# Patient Record
Sex: MALE | Race: WHITE
[De-identification: names, ages, dates, MRNs, and addresses within clinical notes are randomized per-mention and may not be internally consistent; named-entity substitution may affect disease eponyms.]

---

## 2018-08-28 ENCOUNTER — HOSPITAL ENCOUNTER (INPATIENT)
Dept: HOSPITAL 61 - ER | Age: 23
LOS: 1 days | Discharge: HOME | DRG: 313 | End: 2018-08-29
Attending: FAMILY MEDICINE | Admitting: FAMILY MEDICINE
Payer: COMMERCIAL

## 2018-08-28 VITALS — BODY MASS INDEX: 30.92 KG/M2 | WEIGHT: 216 LBS | HEIGHT: 70 IN

## 2018-08-28 VITALS — DIASTOLIC BLOOD PRESSURE: 63 MMHG | SYSTOLIC BLOOD PRESSURE: 114 MMHG

## 2018-08-28 VITALS — DIASTOLIC BLOOD PRESSURE: 61 MMHG | SYSTOLIC BLOOD PRESSURE: 113 MMHG

## 2018-08-28 DIAGNOSIS — F14.10: ICD-10-CM

## 2018-08-28 DIAGNOSIS — F12.10: ICD-10-CM

## 2018-08-28 DIAGNOSIS — E66.9: ICD-10-CM

## 2018-08-28 DIAGNOSIS — F41.9: ICD-10-CM

## 2018-08-28 DIAGNOSIS — R07.89: Primary | ICD-10-CM

## 2018-08-28 DIAGNOSIS — Z79.82: ICD-10-CM

## 2018-08-28 DIAGNOSIS — I25.2: ICD-10-CM

## 2018-08-28 DIAGNOSIS — I42.9: ICD-10-CM

## 2018-08-28 LAB
AMPHETAMINE/METHAMPHETAMINE: (no result)
ANION GAP SERPL CALC-SCNC: 7 MMOL/L (ref 6–14)
APTT PPP: (no result) S
BACTERIA #/AREA URNS HPF: (no result) /HPF
BARBITURATES UR-MCNC: (no result) UG/ML
BASOPHILS # BLD AUTO: 0 X10^3/UL (ref 0–0.2)
BASOPHILS NFR BLD: 0 % (ref 0–3)
BENZODIAZ UR-MCNC: (no result) UG/L
BILIRUB UR QL STRIP: (no result)
BUN SERPL-MCNC: 12 MG/DL (ref 8–26)
CALCIUM SERPL-MCNC: 8.7 MG/DL (ref 8.5–10.1)
CANNABINOIDS UR-MCNC: (no result) UG/L
CHLORIDE SERPL-SCNC: 104 MMOL/L (ref 98–107)
CK SERPL-CCNC: 122 U/L (ref 39–308)
CO2 SERPL-SCNC: 27 MMOL/L (ref 21–32)
COCAINE UR-MCNC: (no result) NG/ML
CREAT SERPL-MCNC: 0.9 MG/DL (ref 0.7–1.3)
EOSINOPHIL NFR BLD: 0.1 X10^3/UL (ref 0–0.7)
EOSINOPHIL NFR BLD: 1 % (ref 0–3)
ERYTHROCYTE [DISTWIDTH] IN BLOOD BY AUTOMATED COUNT: 13.3 % (ref 11.5–14.5)
FIBRINOGEN PPP-MCNC: CLEAR MG/DL
GFR SERPLBLD BASED ON 1.73 SQ M-ARVRAT: 104.6 ML/MIN
GLUCOSE SERPL-MCNC: 101 MG/DL (ref 70–99)
HCT VFR BLD CALC: 45.6 % (ref 39–53)
HGB BLD-MCNC: 15.4 G/DL (ref 13–17.5)
HYALINE CASTS #/AREA URNS LPF: (no result) /HPF
LYMPHOCYTES # BLD: 1.8 X10^3/UL (ref 1–4.8)
LYMPHOCYTES NFR BLD AUTO: 19 % (ref 24–48)
MAGNESIUM SERPL-MCNC: 1.9 MG/DL (ref 1.8–2.4)
MCH RBC QN AUTO: 29 PG (ref 25–35)
MCHC RBC AUTO-ENTMCNC: 34 G/DL (ref 31–37)
MCV RBC AUTO: 85 FL (ref 79–100)
METHADONE SERPL-MCNC: (no result) NG/ML
MONO #: 0.8 X10^3/UL (ref 0–1.1)
MONOCYTES NFR BLD: 9 % (ref 0–9)
NEUT #: 6.7 X10^3UL (ref 1.8–7.7)
NEUTROPHILS NFR BLD AUTO: 71 % (ref 31–73)
NITRITE UR QL STRIP: NEGATIVE
OPIATES UR-MCNC: (no result) NG/ML
PCP SERPL-MCNC: (no result) MG/DL
PH UR STRIP: 6 [PH]
PLATELET # BLD AUTO: 276 X10^3/UL (ref 140–400)
POTASSIUM SERPL-SCNC: 3.5 MMOL/L (ref 3.5–5.1)
PROT UR STRIP-MCNC: 30 MG/DL
RBC # BLD AUTO: 5.34 X10^6/UL (ref 4.3–5.7)
RBC #/AREA URNS HPF: (no result) /HPF (ref 0–2)
SODIUM SERPL-SCNC: 138 MMOL/L (ref 136–145)
SQUAMOUS #/AREA URNS LPF: (no result) /LPF
UROBILINOGEN UR-MCNC: 1 MG/DL
WBC # BLD AUTO: 9.4 X10^3/UL (ref 4–11)
WBC #/AREA URNS HPF: (no result) /HPF (ref 0–4)

## 2018-08-28 PROCEDURE — 85025 COMPLETE CBC W/AUTO DIFF WBC: CPT

## 2018-08-28 PROCEDURE — 93306 TTE W/DOPPLER COMPLETE: CPT

## 2018-08-28 PROCEDURE — 83735 ASSAY OF MAGNESIUM: CPT

## 2018-08-28 PROCEDURE — 71045 X-RAY EXAM CHEST 1 VIEW: CPT

## 2018-08-28 PROCEDURE — 83880 ASSAY OF NATRIURETIC PEPTIDE: CPT

## 2018-08-28 PROCEDURE — 84484 ASSAY OF TROPONIN QUANT: CPT

## 2018-08-28 PROCEDURE — 36415 COLL VENOUS BLD VENIPUNCTURE: CPT

## 2018-08-28 PROCEDURE — 84443 ASSAY THYROID STIM HORMONE: CPT

## 2018-08-28 PROCEDURE — 87086 URINE CULTURE/COLONY COUNT: CPT

## 2018-08-28 PROCEDURE — 80307 DRUG TEST PRSMV CHEM ANLYZR: CPT

## 2018-08-28 PROCEDURE — 93005 ELECTROCARDIOGRAM TRACING: CPT

## 2018-08-28 PROCEDURE — 82553 CREATINE MB FRACTION: CPT

## 2018-08-28 PROCEDURE — 81001 URINALYSIS AUTO W/SCOPE: CPT

## 2018-08-28 PROCEDURE — 80048 BASIC METABOLIC PNL TOTAL CA: CPT

## 2018-08-28 PROCEDURE — 85379 FIBRIN DEGRADATION QUANT: CPT

## 2018-08-28 PROCEDURE — G0479 DRUG TEST PRESUMP NOT OPT: HCPCS

## 2018-08-28 RX ADMIN — NITROGLYCERIN PRN MG: 0.4 TABLET SUBLINGUAL at 15:30

## 2018-08-28 RX ADMIN — NITROGLYCERIN PRN MG: 0.4 TABLET SUBLINGUAL at 14:54

## 2018-08-28 RX ADMIN — NITROGLYCERIN PRN MG: 0.4 TABLET SUBLINGUAL at 20:35

## 2018-08-28 RX ADMIN — NITROGLYCERIN PRN MG: 0.4 TABLET SUBLINGUAL at 20:06

## 2018-08-28 RX ADMIN — NITROGLYCERIN PRN MG: 0.4 TABLET SUBLINGUAL at 15:23

## 2018-08-28 NOTE — EKG
[facility]

              [address] [place] [address]

Test Date:    2018               Test Time:    13:53:31

Pat Name:     LALIT MONK              Department:   

Patient ID:   [id number]           Room:          

Gender:       M                        Technician:   

:          [id number]               Requested By: LOIDA PATEL

Order Number: [id number]           Reading MD:   Erik Mott MD

                                 Measurements

Intervals                              Axis          

Rate:         77                       P:            41

SD:           146                      QRS:          56

QRSD:         96                       T:            28

QT:           346                                    

QTc:          393                                    

                           Interpretive Statements

SINUS RHYTHM



Electronically Signed On 2018 12:27:46 CDT by Erik Mott MD

## 2018-08-28 NOTE — RAD
PORTABLE CHEST 1V dated 8/28/2018 2:22 PM.

 

Comparison: 12/20/2017

 

Clinical Indication: CHEST PAIN STARTING @ 1100 TODAY..

 

Findings:

 

Single upright portable exam performed. Heart and mediastinal contours are

within normal limits. Lungs are clear without focal consolidation. 

Vascular interstitium within normal limits. No pleural effusion or 

pneumothorax.

 

Impression:

 

Negative portable chest. 

 

Electronically signed by: Shree Almeida MD (8/28/2018 2:39 PM) 

[facility]

## 2018-08-28 NOTE — PDOC2
JOVAN THORPE APRN 8/28/18 1639:


CARDIAC CONSULT


DATE OF CONSULT


Date of Consult


DATE: 8/28/18 


TIME: 16:22





REASON FOR CONSULT


Reason for Consult:


Chest pain





REFERRING PHYSICIAN


Referring Physician:


Mike





SOURCE


Source:  Chart review, Patient





HISTORY OF PRESENT ILLNESS


HISTORY OF PRESENT ILLNESS


This is a pleasant 21 yo male admitted for complains of chest pain.  He is 

significant for NICM and had C 12/2017 which showed no CAD but with EF of 45%

. This was then optimized further as an outpt.  Reports no GARDUNO, exertional CP, 

palpiations, nausea or vomiting or significant heartburn.  No recent heavy 

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 

he climb up today and after he got up and at the roof he started having this 

focal left sternal sharp pain which is nonradiating and this reproducible with 

palpation and also with upper arm ROM. This pain is not the same as when he had 

the NSTEMI in 12/2017 which was ruled as vasopasm with use of cocaine.  He 

still continues to take norvasc. He used to use cocain and ecstasy and he has 

not used it since 12/2017.





PAST MEDICAL HISTORY


Cardiovascular:  Other (NICM; NSTEMI)


Pulmonary:  No pertinent hx


CENTRAL NERVOUS SYSTEM:  Other (None)


GI:  No pertinent hx


Heme/Onc:  No pertinent hx


Hepatobiliary:  No pertinent hx


Psych:  Anxiety


Musculoskeletal:  Other (None)


Rheumatologic:  No pertinent hx


Infectious disease:  No pertinent hx


ENT:  No pertinent hx


Renal/:  No pertinent hx


Endocrine:  No pertinent hx


Dermatology:  No pertinent hx





PAST SURGICAL HISTORY


Past Surgical History:  Other ([facility])





FAMILY HISTORY


Family History


noncontributory to CV





SOCIAL HISTORY


Smoke:  No


ALCOHOL:  none


Drugs:  Other (Past ecstasy and cocaine use)


Lives:  with Family





CURRENT MEDICATIONS


CURRENT MEDICATIONS





Current Medications








 Medications


  (Trade)  Dose


 Ordered  Sig/Mikel


 Route


 PRN Reason  Start Time


 Stop Time Status Last Admin


Dose Admin


 


 Aspirin


  (Juan Jose Aspirin)  325 mg  1X  ONCE


 PO


   8/28/18 14:15


 8/28/18 14:16 DC 8/28/18 14:53





 


 Nitroglycerin


  (Nitrostat)  0.4 mg  PRN Q5MIN  PRN


 SL


 CP RATING > 1/10  8/28/18 14:15


 8/29/18 14:14  8/28/18 15:30





 


 Sodium Chloride  1,000 ml @ 


 1,000 mls/hr  1X  ONCE


 IV


   8/28/18 14:15


 8/28/18 15:14 DC 8/28/18 14:53














ALLERGIES


ALLERGIES:  


Coded Allergies:  


     No Known Drug Allergies (Unverified , 12/20/17)





ROS


Review of System


14 point ROS evaluated with pertinent positives noted per HPI





PHYSICAL EXAM


General:  Alert, Oriented X3, Cooperative, No acute distress


HEENT:  Atraumatic, Mucous membr. moist/pink


Lungs:  Clear to auscultation, Normal air movement


Heart:  Regular rate (SR), Normal S1, Normal S2, No murmurs


Abdomen:  Soft, No tenderness


Extremities:  No cyanosis, No edema


Skin:  No breakdown, No significant lesion


Neuro:  Normal speech, Sensation intact


Psych/Mental Status:  Mental status NL, Mood NL


MUSCULOSKELETAL:  Osteoarthritic changes both hands





VITALS


VITALS





Vital Signs








  Date Time  Temp Pulse Resp B/P (MAP) Pulse Ox O2 Delivery O2 Flow Rate FiO2


 


8/28/18 15:30  69  114/57    


 


8/28/18 13:53 98.1  16  98 Room Air  





 98.1       











LABS


Lab:





Laboratory Tests








Test


 8/28/18


14:50


 


White Blood Count


 9.4 x10^3/uL


(4.0-11.0)


 


Red Blood Count


 5.34 x10^6/uL


(4.30-5.70)


 


Hemoglobin


 15.4 g/dL


(13.0-17.5)


 


Hematocrit


 45.6 %


(39.0-53.0)


 


Mean Corpuscular Volume 85 fL () 


 


Mean Corpuscular Hemoglobin 29 pg (25-35) 


 


Mean Corpuscular Hemoglobin


Concent 34 g/dL


(31-37)


 


Red Cell Distribution Width


 13.3 %


(11.5-14.5)


 


Platelet Count


 276 x10^3/uL


(140-400)


 


Neutrophils (%) (Auto) 71 % (31-73) 


 


Lymphocytes (%) (Auto) 19 % (24-48) 


 


Monocytes (%) (Auto) 9 % (0-9) 


 


Eosinophils (%) (Auto) 1 % (0-3) 


 


Basophils (%) (Auto) 0 % (0-3) 


 


Neutrophils # (Auto)


 6.7 x10^3uL


(1.8-7.7)


 


Lymphocytes # (Auto)


 1.8 x10^3/uL


(1.0-4.8)


 


Monocytes # (Auto)


 0.8 x10^3/uL


(0.0-1.1)


 


Eosinophils # (Auto)


 0.1 x10^3/uL


(0.0-0.7)


 


Basophils # (Auto)


 0.0 x10^3/uL


(0.0-0.2)


 


D-Dimer (Brigid)


 0.36 ug/mlFEU


(0.00-0.50)


 


Urine Collection Type Unknown 


 


Urine Color Camille 


 


Urine Clarity Clear 


 


Urine pH 6.0 


 


Urine Specific Gravity >=1.030 


 


Urine Protein


 30 mg/dL


(NEG-TRACE)


 


Urine Glucose (UA)


 Negative mg/dL


(NEG)


 


Urine Ketones (Stick)


 Negative mg/dL


(NEG)


 


Urine Blood Negative (NEG) 


 


Urine Nitrite Negative (NEG) 


 


Urine Bilirubin Small (NEG) 


 


Urine Urobilinogen Dipstick


 1.0 mg/dL (0.2


mg/dL)


 


Urine Leukocyte Esterase Trace (NEG) 


 


Urine RBC Occ /HPF (0-2) 


 


Urine WBC


 5-10 /HPF


(0-4)


 


Urine Squamous Epithelial


Cells Few /LPF 





 


Urine Bacteria


 Few /HPF


(0-FEW)


 


Urine Hyaline Casts Few /HPF 


 


Urine Mucus Marked /LPF 


 


Sodium Level


 138 mmol/L


(136-145)


 


Potassium Level


 3.5 mmol/L


(3.5-5.1)


 


Chloride Level


 104 mmol/L


()


 


Carbon Dioxide Level


 27 mmol/L


(21-32)


 


Anion Gap 7 (6-14) 


 


Blood Urea Nitrogen


 12 mg/dL


(8-26)


 


Creatinine


 0.9 mg/dL


(0.7-1.3)


 


Estimated GFR


(Cockcroft-Gault) 104.6 





 


Glucose Level


 101 mg/dL


(70-99)


 


Calcium Level


 8.7 mg/dL


(8.5-10.1)


 


Magnesium Level


 1.9 mg/dL


(1.8-2.4)


 


Creatine Kinase


 122 U/L


()


 


Creatine Kinase MB (Mass)


 < 0.5 ng/mL


(0.0-3.6)


 


Creatine Kinase MB Relative


Index  % (0-4) 





 


Troponin I Quantitative


 < 0.017 ng/mL


(0.000-0.055)


 


NT-Pro-B-Type Natriuretic


Peptide 15 pg/mL


(0-124)


 


Thyroid Stimulating Hormone


(TSH) 0.974 uIU/mL


(0.358-3.74)


 


Urine Opiates Screen Neg (NEG) 


 


Urine Methadone Screen Neg (NEG) 


 


Urine Barbiturates Neg (NEG) 


 


Urine Phencyclidine Screen Neg (NEG) 


 


Urine


Amphetamine/Methamphetamine Neg (NEG) 





 


Urine Benzodiazepines Screen Neg (NEG) 


 


Urine Cocaine Screen Neg (NEG) 


 


Urine Cannabinoids Screen Pos (NEG) 


 


Urine Ethyl Alcohol Neg (NEG) 











ECHOCARDIOGRAM


ECHOCARDIOGRAM





<Conclusion>


The left ventricle is normal size.


Mild global hypokinesis.  


Estimated ejection fraction is 40%.


There is normal left ventricular wall thickness.


There is no significant aortic valvular stenosis.


Doppler and Color Flow revealed no significant aortic regurgitation.


Doppler and Color-flow revealed trace mitral regurgitation.


Doppler and Color Flow revealed no tricuspid valve regurgitation noted.


There is no evidence of significant pericardial effusion.





DATE:     12/20/17 1351





HEART CATH


HEART CATH





Conclusion


1.  No significant coronary artery disease


2.  Mild hypokinesis of the mid segment of anterolateral wall with ejection 

fraction estimated at 45% 





Recommendations


Patient had significant elevation of troponin level without any significant 

coronary artery stenosis, probably secondary to vasospasm.


Recommend medical therapy.





DATE:     12/20/17 0900





ASSESSMENT/PLAN


ASSESSMENT/PLAN


1. Atypical CP: MSK, noncardiac.  Troponin nml and EKG SR without acute changes.


2. Hx of NSTEMI  r/t NICM, cocaine and vasopasm. 


3. NICM


4. Marijuana use





Recommendations


1. Continue with 81 mg ASA and plavix. 


2. Continue low dose norvasc. 


3. TTE for NICM f/u.





JOSÉ MIGUEL SANDERS MD 8/29/18 1325:


CARDIAC CONSULT


ASSESSMENT/PLAN


ASSESSMENT/PLAN


Patient seen and examined 8/28/18 (late entry).  Agree with NP's assessment and 

plan.


CP with atypical features and most probably musculoskeletal


MI ruled out


Recent cardiac cath did not show any significant CAD


NICM clinically well compensated


Continue current medical regimen


Thank you for your consultation.











JOVAN THORPE Aug 28, 2018 16:39


JOSÉ MIGUEL SANDERS MD Aug 29, 2018 13:25

## 2018-08-28 NOTE — PDOC1
History and Physical


Date of Admission


Date of Admission


DATE: 8/28/18 


TIME: 18:49





Identification/Chief Complaint


Chief Complaint


 CC 22  year old male with history of heart attack last year induced by Estacy, 

cocaine, and heavy parting who presents today complaining of a sharp constant 5 

out of 10 left-sided chest pain nonradiating in nature that began at 11 AM this 

morning when he was working. 





 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





 HX NICM and had C 12/2017 ,  no CAD but with EF of 45%.





Past Medical History


Cardiovascular:  Other (NICM; NSTEMI)


Pulmonary:  No pertinent hx


CENTRAL NERVOUS SYSTEM:  Other (None)


GI:  No pertinent hx


Heme/Onc:  No pertinent hx


Hepatobiliary:  No pertinent hx


Psych:  Anxiety


Musculoskeletal:  Other (None)


Rheumatologic:  No pertinent hx


Infectious disease:  No pertinent hx


ENT:  No pertinent hx


Renal/:  No pertinent hx


Endocrine:  No pertinent hx


Dermatology:  No pertinent hx





Past Surgical History


Past Surgical History:  Other ([facility])





Social History


Smoke:  No


ALCOHOL:  none


Drugs:  Other (Past ecstasy and cocaine use)





Current Problem List


Problem List


Problems


Medical Problems:


(1) Chest pain


Status: Acute  











Current Medications


Current Medications





Current Medications


Aspirin (Juan Jose Aspirin) 325 mg 1X  ONCE PO  Last administered on 8/28/18at 14:53

;  Start 8/28/18 at 14:15;  Stop 8/28/18 at 14:16;  Status DC


Nitroglycerin (Nitrostat) 0.4 mg PRN Q5MIN  PRN SL CP RATING > 1/10 Last 

administered on 8/28/18at 15:30;  Start 8/28/18 at 14:15;  Stop 8/29/18 at 14:14


Sodium Chloride 1,000 ml @  1,000 mls/hr 1X  ONCE IV  Last administered on 8/28/ 18at 14:53;  Start 8/28/18 at 14:15;  Stop 8/28/18 at 15:14;  Status DC


Ondansetron HCl (Zofran) 4 mg PRN Q8HRS  PRN IV NAUSEA/VOMITING;  Start 8/28/18 

at 16:15;  Stop 8/29/18 at 16:14


Morphine Sulfate (Morphine Sulfate) 4 mg PRN Q2HR  PRN IV PAIN;  Start 8/28/18 

at 16:15;  Stop 8/29/18 at 16:14


Acetaminophen (Tylenol) 650 mg PRN Q4HRS  PRN PO FEVER;  Start 8/28/18 at 16:15

;  Stop 8/29/18 at 16:14


Nitroglycerin (Nitrostat) 0.4 mg PRN Q5MIN  PRN SL CHEST PAIN;  Start 8/28/18 

at 16:15;  Stop 8/28/18 at 16:15;  Status DC


Aspirin (Ecotrin) 81 mg DAILYWBKFT PO ;  Start 8/29/18 at 08:00


Clopidogrel Bisulfate (Plavix) 75 mg DAILY PO ;  Start 8/29/18 at 09:00


Amlodipine Besylate (Norvasc) 2.5 mg DAILY PO ;  Start 8/29/18 at 09:00





Active Scripts


Active


Reported


NITROGLYCERIN SubLingual (Nitroglycerin) 0.4 Mg Tab.subl 0.4 Mg SL PRN Q5MIN PRN


Aspirin 325 Mg Tablet 1 Tab PO DAILY


Amlodipine Besylate 2.5 Mg Tablet 2.5 Mg PO DAILY


Tylenol (Acetaminophen) 325 Mg Tablet 1-2 Tab PO QID





Allergies


Allergies:  


Coded Allergies:  


     No Known Drug Allergies (Unverified , 12/20/17)





ROS


Review of System





Review of Systems


Review of Systems





Constitutional: Denies fever or chills []


Eyes: Denies change in visual acuity, redness, or eye pain []


HENT: Denies nasal congestion or sore throat []


Respiratory: Denies cough or shortness of breath []


Cardiovascular: Reports left-sided chest pain, APPEARS MORE CHEST WALL


GI: Denies abdominal pain, nausea, vomiting, bloody stools or diarrhea []


: Denies dysuria or hematuria []


Musculoskeletal: Denies back pain or joint pain []


Integument: Denies rash or skin lesions []


Neurologic: Denies headache, focal weakness or sensory changes []





14  systems were reviewed and found to be within normal limits, except as 

documented


General:  No: Chills, Night Sweats, Fatigue, Malaise, Appetite, Other


Cardiovascular:  yes Chest Pain





Physical Exam


Physical Exam





Physical Exam


Physical Exam





Constitutional: Well developed, well nourished, no acute distress, non-toxic 

appearance. []


HENT: Normocephalic, atraumatic, bilateral external ears normal, oropharynx 

moist, no oral exudates, nose normal. []


Eyes: PERRLA, EOMI, conjunctiva normal, no discharge. [] 


Neck: Normal range of motion, no tenderness, supple, no stridor. [] 


Cardiovascular:Heart rate regular rhythm, no murmur []


Lungs & Thorax:  Bilateral breath sounds clear to auscultation []


Abdomen: Bowel sounds normal, soft, no tenderness, no masses, no pulsatile 

masses. [] 


Skin: Warm, dry, no erythema, no rash. [] 


Back: No tenderness, no CVA tenderness. [] 


Extremities: No tenderness, no cyanosis, no clubbing, ROM intact, no edema. [] 


Neurologic: Alert and oriented X 3, normal motor function, normal sensory 

function, no focal deficits noted. []


Psychologic: Affect normal, judgement normal, mood normal. []


General:  Alert, Oriented X3, Cooperative


HEENT:  Atraumatic, PERRLA, EOMI, Mucous membr. moist/pink


Lungs:  Clear to auscultation, Normal air movement


Heart:  RRR, no thrills


Breasts:  Not examined


Abdomen:  Normal bowel sounds, Soft, No tenderness


Rectal Exam:  not examined


Extremities:  No clubbing, No cyanosis


Neuro:  Cranial nerves 3-12 NL


Psych/Mental Status:  Mental status NL





Vitals


Vitals





Vital Signs








  Date Time  Temp Pulse Resp B/P (MAP) Pulse Ox O2 Delivery O2 Flow Rate FiO2


 


8/28/18 18:24      Room Air  


 


8/28/18 17:06  64 20 117/66 (83) 99   


 


8/28/18 13:53 98.1       





 98.1       











Labs


Labs





Laboratory Tests








Test


 8/28/18


14:50


 


White Blood Count


 9.4 x10^3/uL


(4.0-11.0)


 


Red Blood Count


 5.34 x10^6/uL


(4.30-5.70)


 


Hemoglobin


 15.4 g/dL


(13.0-17.5)


 


Hematocrit


 45.6 %


(39.0-53.0)


 


Mean Corpuscular Volume 85 fL () 


 


Mean Corpuscular Hemoglobin 29 pg (25-35) 


 


Mean Corpuscular Hemoglobin


Concent 34 g/dL


(31-37)


 


Red Cell Distribution Width


 13.3 %


(11.5-14.5)


 


Platelet Count


 276 x10^3/uL


(140-400)


 


Neutrophils (%) (Auto) 71 % (31-73) 


 


Lymphocytes (%) (Auto) 19 % (24-48) 


 


Monocytes (%) (Auto) 9 % (0-9) 


 


Eosinophils (%) (Auto) 1 % (0-3) 


 


Basophils (%) (Auto) 0 % (0-3) 


 


Neutrophils # (Auto)


 6.7 x10^3uL


(1.8-7.7)


 


Lymphocytes # (Auto)


 1.8 x10^3/uL


(1.0-4.8)


 


Monocytes # (Auto)


 0.8 x10^3/uL


(0.0-1.1)


 


Eosinophils # (Auto)


 0.1 x10^3/uL


(0.0-0.7)


 


Basophils # (Auto)


 0.0 x10^3/uL


(0.0-0.2)


 


D-Dimer (Brigid)


 0.36 ug/mlFEU


(0.00-0.50)


 


Urine Collection Type Unknown 


 


Urine Color Camille 


 


Urine Clarity Clear 


 


Urine pH 6.0 


 


Urine Specific Gravity >=1.030 


 


Urine Protein


 30 mg/dL


(NEG-TRACE)


 


Urine Glucose (UA)


 Negative mg/dL


(NEG)


 


Urine Ketones (Stick)


 Negative mg/dL


(NEG)


 


Urine Blood Negative (NEG) 


 


Urine Nitrite Negative (NEG) 


 


Urine Bilirubin Small (NEG) 


 


Urine Urobilinogen Dipstick


 1.0 mg/dL (0.2


mg/dL)


 


Urine Leukocyte Esterase Trace (NEG) 


 


Urine RBC Occ /HPF (0-2) 


 


Urine WBC


 5-10 /HPF


(0-4)


 


Urine Squamous Epithelial


Cells Few /LPF 





 


Urine Bacteria


 Few /HPF


(0-FEW)


 


Urine Hyaline Casts Few /HPF 


 


Urine Mucus Marked /LPF 


 


Sodium Level


 138 mmol/L


(136-145)


 


Potassium Level


 3.5 mmol/L


(3.5-5.1)


 


Chloride Level


 104 mmol/L


()


 


Carbon Dioxide Level


 27 mmol/L


(21-32)


 


Anion Gap 7 (6-14) 


 


Blood Urea Nitrogen


 12 mg/dL


(8-26)


 


Creatinine


 0.9 mg/dL


(0.7-1.3)


 


Estimated GFR


(Cockcroft-Gault) 104.6 





 


Glucose Level


 101 mg/dL


(70-99)


 


Calcium Level


 8.7 mg/dL


(8.5-10.1)


 


Magnesium Level


 1.9 mg/dL


(1.8-2.4)


 


Creatine Kinase


 122 U/L


()


 


Creatine Kinase MB (Mass)


 < 0.5 ng/mL


(0.0-3.6)


 


Creatine Kinase MB Relative


Index  % (0-4) 





 


Troponin I Quantitative


 < 0.017 ng/mL


(0.000-0.055)


 


NT-Pro-B-Type Natriuretic


Peptide 15 pg/mL


(0-124)


 


Thyroid Stimulating Hormone


(TSH) 0.974 uIU/mL


(0.358-3.74)


 


Urine Opiates Screen Neg (NEG) 


 


Urine Methadone Screen Neg (NEG) 


 


Urine Barbiturates Neg (NEG) 


 


Urine Phencyclidine Screen Neg (NEG) 


 


Urine


Amphetamine/Methamphetamine Neg (NEG) 





 


Urine Benzodiazepines Screen Neg (NEG) 


 


Urine Cocaine Screen Neg (NEG) 


 


Urine Cannabinoids Screen Pos (NEG) 


 


Urine Ethyl Alcohol Neg (NEG) 








Laboratory Tests








Test


 8/28/18


14:50


 


White Blood Count


 9.4 x10^3/uL


(4.0-11.0)


 


Red Blood Count


 5.34 x10^6/uL


(4.30-5.70)


 


Hemoglobin


 15.4 g/dL


(13.0-17.5)


 


Hematocrit


 45.6 %


(39.0-53.0)


 


Mean Corpuscular Volume 85 fL () 


 


Mean Corpuscular Hemoglobin 29 pg (25-35) 


 


Mean Corpuscular Hemoglobin


Concent 34 g/dL


(31-37)


 


Red Cell Distribution Width


 13.3 %


(11.5-14.5)


 


Platelet Count


 276 x10^3/uL


(140-400)


 


Neutrophils (%) (Auto) 71 % (31-73) 


 


Lymphocytes (%) (Auto) 19 % (24-48) 


 


Monocytes (%) (Auto) 9 % (0-9) 


 


Eosinophils (%) (Auto) 1 % (0-3) 


 


Basophils (%) (Auto) 0 % (0-3) 


 


Neutrophils # (Auto)


 6.7 x10^3uL


(1.8-7.7)


 


Lymphocytes # (Auto)


 1.8 x10^3/uL


(1.0-4.8)


 


Monocytes # (Auto)


 0.8 x10^3/uL


(0.0-1.1)


 


Eosinophils # (Auto)


 0.1 x10^3/uL


(0.0-0.7)


 


Basophils # (Auto)


 0.0 x10^3/uL


(0.0-0.2)


 


D-Dimer (Brigid)


 0.36 ug/mlFEU


(0.00-0.50)


 


Urine Collection Type Unknown 


 


Urine Color Camille 


 


Urine Clarity Clear 


 


Urine pH 6.0 


 


Urine Specific Gravity >=1.030 


 


Urine Protein


 30 mg/dL


(NEG-TRACE)


 


Urine Glucose (UA)


 Negative mg/dL


(NEG)


 


Urine Ketones (Stick)


 Negative mg/dL


(NEG)


 


Urine Blood Negative (NEG) 


 


Urine Nitrite Negative (NEG) 


 


Urine Bilirubin Small (NEG) 


 


Urine Urobilinogen Dipstick


 1.0 mg/dL (0.2


mg/dL)


 


Urine Leukocyte Esterase Trace (NEG) 


 


Urine RBC Occ /HPF (0-2) 


 


Urine WBC


 5-10 /HPF


(0-4)


 


Urine Squamous Epithelial


Cells Few /LPF 





 


Urine Bacteria


 Few /HPF


(0-FEW)


 


Urine Hyaline Casts Few /HPF 


 


Urine Mucus Marked /LPF 


 


Sodium Level


 138 mmol/L


(136-145)


 


Potassium Level


 3.5 mmol/L


(3.5-5.1)


 


Chloride Level


 104 mmol/L


()


 


Carbon Dioxide Level


 27 mmol/L


(21-32)


 


Anion Gap 7 (6-14) 


 


Blood Urea Nitrogen


 12 mg/dL


(8-26)


 


Creatinine


 0.9 mg/dL


(0.7-1.3)


 


Estimated GFR


(Cockcroft-Gault) 104.6 





 


Glucose Level


 101 mg/dL


(70-99)


 


Calcium Level


 8.7 mg/dL


(8.5-10.1)


 


Magnesium Level


 1.9 mg/dL


(1.8-2.4)


 


Creatine Kinase


 122 U/L


()


 


Creatine Kinase MB (Mass)


 < 0.5 ng/mL


(0.0-3.6)


 


Creatine Kinase MB Relative


Index  % (0-4) 





 


Troponin I Quantitative


 < 0.017 ng/mL


(0.000-0.055)


 


NT-Pro-B-Type Natriuretic


Peptide 15 pg/mL


(0-124)


 


Thyroid Stimulating Hormone


(TSH) 0.974 uIU/mL


(0.358-3.74)


 


Urine Opiates Screen Neg (NEG) 


 


Urine Methadone Screen Neg (NEG) 


 


Urine Barbiturates Neg (NEG) 


 


Urine Phencyclidine Screen Neg (NEG) 


 


Urine


Amphetamine/Methamphetamine Neg (NEG) 





 


Urine Benzodiazepines Screen Neg (NEG) 


 


Urine Cocaine Screen Neg (NEG) 


 


Urine Cannabinoids Screen Pos (NEG) 


 


Urine Ethyl Alcohol Neg (NEG) 











Images


Images


REASON: chest pain


PROCEDURE: PORTABLE CHEST 1V





PORTABLE CHEST 1V dated 8/28/2018 2:22 PM.


 


Comparison: 12/20/2017


 


Clinical Indication: CHEST PAIN STARTING @ 1100 TODAY..


 


Findings:


 


Single upright portable exam performed. Heart and mediastinal contours are


within normal limits. Lungs are clear without focal consolidation. 


Vascular interstitium within normal limits. No pleural effusion or 


pneumothorax.


 


Impression:


 


Negative portable chest. 


 


Electronically signed by: Shree Almeida MD (8/28/2018 2:39 PM) 


[facility]














DICTATED and SIGNED BY:     SHREE ALMEIDA MD


DATE:     08/28/18 1439





VTE Prophylaxis Ordered


VTE Prophylaxis Devices:  Yes


VTE Pharmacological Prophylaxi:  Yes





Assessment/Plan


1. Atypical CHEST PAIN


2. Hx NSTEMI  r/t NICM, cocaine ABUSE


3. NICM


4. Marijuana use/abuse


5. obesity





plan


1.  ASA and plavix. 


2.  norvasc. 


3.  TTE 


4.  EDUCATED on drug use cessation


5.  tele


6, cONSULT CARDIOLOGY


7. DVT PROPHYLAXIS


8, GI prophylaxis











ULI LYNCH MD Aug 28, 2018 18:49

## 2018-08-28 NOTE — PHYS DOC
Past Medical History


Past Medical History:  No Pertinent History


Past Surgical History:  No Surgical History


Alcohol Use:  Occasionally


Drug Use:  Marijuana





Adult General


Chief Complaint


Chief Complaint:  CHEST PAIN





HPI


HPI





Patient is a 22  year old male with history of heart attack last year induced 

by Estacy, cocaine, and heavy parting who presents today complaining of a sharp 

constant 5 out of 10 left-sided chest pain nonradiating in nature that began at 

11 AM this morning when he was working. 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 

at the [employer]. Patient denies anything exacerbating or making his pain 

better. Denies taking anything for his symptoms.





Cardiologist Dr. White


PCP Dr Steward





Review of Systems


Review of Systems





Constitutional: Denies fever or chills []


Eyes: Denies change in visual acuity, redness, or eye pain []


HENT: Denies nasal congestion or sore throat []


Respiratory: Denies cough or shortness of breath []


Cardiovascular: Reports left-sided chest pain


GI: Denies abdominal pain, nausea, vomiting, bloody stools or diarrhea []


: Denies dysuria or hematuria []


Musculoskeletal: Denies back pain or joint pain []


Integument: Denies rash or skin lesions []


Neurologic: Denies headache, focal weakness or sensory changes []





All other systems were reviewed and found to be within normal limits, except as 

documented in this note.





Current Medications


Current Medications





Current Medications








 Medications


  (Trade)  Dose


 Ordered  Sig/Mikel  Start Time


 Stop Time Status Last Admin


Dose Admin


 


 Acetaminophen


  (Tylenol)  650 mg  PRN Q4HRS  PRN  8/28/18 16:15


 8/29/18 16:14   





 


 Aspirin


  (Juan Jose Aspirin)  325 mg  1X  ONCE  8/28/18 14:15


 8/28/18 14:16 DC 8/28/18 14:53


325 MG


 


 Morphine Sulfate


  (Morphine


 Sulfate)  4 mg  PRN Q2HR  PRN  8/28/18 16:15


 8/29/18 16:14   





 


 Nitroglycerin


  (Nitrostat)  0.4 mg  PRN Q5MIN  PRN  8/28/18 16:15


 8/28/18 16:15 DC  





 


 Ondansetron HCl


  (Zofran)  4 mg  PRN Q8HRS  PRN  8/28/18 16:15


 8/29/18 16:14   





 


 Sodium Chloride  1,000 ml @ 


 1,000 mls/hr  1X  ONCE  8/28/18 14:15


 8/28/18 15:14 DC 8/28/18 14:53


1,000 MLS/HR











Allergies


Allergies





Allergies








Coded Allergies Type Severity Reaction Last Updated Verified


 


  No Known Drug Allergies    12/20/17 No











Physical Exam


Physical Exam





Constitutional: Well developed, well nourished, no acute distress, non-toxic 

appearance. []


HENT: Normocephalic, atraumatic, bilateral external ears normal, oropharynx 

moist, no oral exudates, nose normal. []


Eyes: PERRLA, EOMI, conjunctiva normal, no discharge. [] 


Neck: Normal range of motion, no tenderness, supple, no stridor. [] 


Cardiovascular:Heart rate regular rhythm, no murmur []


Lungs & Thorax:  Bilateral breath sounds clear to auscultation []


Abdomen: Bowel sounds normal, soft, no tenderness, no masses, no pulsatile 

masses. [] 


Skin: Warm, dry, no erythema, no rash. [] 


Back: No tenderness, no CVA tenderness. [] 


Extremities: No tenderness, no cyanosis, no clubbing, ROM intact, no edema. [] 


Neurologic: Alert and oriented X 3, normal motor function, normal sensory 

function, no focal deficits noted. []


Psychologic: Affect normal, judgement normal, mood normal. []





Current Patient Data


Vital Signs





 Vital Signs








  Date Time  Temp Pulse Resp B/P (MAP) Pulse Ox O2 Delivery O2 Flow Rate FiO2


 


8/28/18 15:30  69  114/57    


 


8/28/18 13:53 98.1  16  98 Room Air  





 98.1       








Lab Values





 Laboratory Tests








Test


 8/28/18


14:50


 


White Blood Count


 9.4 x10^3/uL


(4.0-11.0)


 


Red Blood Count


 5.34 x10^6/uL


(4.30-5.70)


 


Hemoglobin


 15.4 g/dL


(13.0-17.5)


 


Hematocrit


 45.6 %


(39.0-53.0)


 


Mean Corpuscular Volume


 85 fL ()





 


Mean Corpuscular Hemoglobin 29 pg (25-35)  


 


Mean Corpuscular Hemoglobin


Concent 34 g/dL


(31-37)


 


Red Cell Distribution Width


 13.3 %


(11.5-14.5)


 


Platelet Count


 276 x10^3/uL


(140-400)


 


Neutrophils (%) (Auto) 71 % (31-73)  


 


Lymphocytes (%) (Auto) 19 % (24-48)  L


 


Monocytes (%) (Auto) 9 % (0-9)  


 


Eosinophils (%) (Auto) 1 % (0-3)  


 


Basophils (%) (Auto) 0 % (0-3)  


 


Neutrophils # (Auto)


 6.7 x10^3uL


(1.8-7.7)


 


Lymphocytes # (Auto)


 1.8 x10^3/uL


(1.0-4.8)


 


Monocytes # (Auto)


 0.8 x10^3/uL


(0.0-1.1)


 


Eosinophils # (Auto)


 0.1 x10^3/uL


(0.0-0.7)


 


Basophils # (Auto)


 0.0 x10^3/uL


(0.0-0.2)


 


D-Dimer (Brigid)


 0.36 ug/mlFEU


(0.00-0.50)


 


Urine Collection Type Unknown  


 


Urine Color Camille  


 


Urine Clarity Clear  


 


Urine pH 6.0  


 


Urine Specific Gravity >=1.030  


 


Urine Protein


 30 mg/dL


(NEG-TRACE)


 


Urine Glucose (UA)


 Negative mg/dL


(NEG)


 


Urine Ketones (Stick)


 Negative mg/dL


(NEG)


 


Urine Blood


 Negative (NEG)





 


Urine Nitrite


 Negative (NEG)





 


Urine Bilirubin Small (NEG)  


 


Urine Urobilinogen Dipstick


 1.0 mg/dL (0.2


mg/dL)


 


Urine Leukocyte Esterase Trace (NEG)  


 


Urine RBC


 Occ /HPF (0-2)





 


Urine WBC


 5-10 /HPF


(0-4)


 


Urine Squamous Epithelial


Cells Few /LPF  





 


Urine Bacteria


 Few /HPF


(0-FEW)


 


Urine Hyaline Casts Few /HPF  


 


Urine Mucus Marked /LPF  


 


Sodium Level


 138 mmol/L


(136-145)


 


Potassium Level


 3.5 mmol/L


(3.5-5.1)


 


Chloride Level


 104 mmol/L


()


 


Carbon Dioxide Level


 27 mmol/L


(21-32)


 


Anion Gap 7 (6-14)  


 


Blood Urea Nitrogen


 12 mg/dL


(8-26)


 


Creatinine


 0.9 mg/dL


(0.7-1.3)


 


Estimated GFR


(Cockcroft-Gault) 104.6  





 


Glucose Level


 101 mg/dL


(70-99)  H


 


Calcium Level


 8.7 mg/dL


(8.5-10.1)


 


Magnesium Level


 1.9 mg/dL


(1.8-2.4)


 


Creatine Kinase


 122 U/L


()


 


Creatine Kinase MB (Mass)


 < 0.5 ng/mL


(0.0-3.6)


 


Creatine Kinase MB Relative


Index  % (0-4)  





 


Troponin I Quantitative


 < 0.017 ng/mL


(0.000-0.055)


 


NT-Pro-B-Type Natriuretic


Peptide 15 pg/mL


(0-124)


 


Thyroid Stimulating Hormone


(TSH) 0.974 uIU/mL


(0.358-3.74)


 


Urine Opiates Screen Neg (NEG)  


 


Urine Methadone Screen Neg (NEG)  


 


Urine Barbiturates Neg (NEG)  


 


Urine Phencyclidine Screen Neg (NEG)  


 


Urine


Amphetamine/Methamphetamine Neg (NEG)  





 


Urine Benzodiazepines Screen Neg (NEG)  


 


Urine Cocaine Screen Neg (NEG)  


 


Urine Cannabinoids Screen Pos (NEG)  


 


Urine Ethyl Alcohol Neg (NEG)  





 Laboratory Tests


8/28/18 14:50








 Laboratory Tests


8/28/18 14:50











EKG


EKG


13:53 interpreted by Dr. Lemon sinus rhythm heart rate 77 no STEMI





Radiology/Procedures


Radiology/Procedures


[]PROCEDURE: PORTABLE CHEST 1V





PORTABLE CHEST 1V dated 8/28/2018 2:22 PM.


 


Comparison: 12/20/2017


 


Clinical Indication: CHEST PAIN STARTING @ 1100 TODAY..


 


Findings:


 


Single upright portable exam performed. Heart and mediastinal contours are


within normal limits. Lungs are clear without focal consolidation. 


Vascular interstitium within normal limits. No pleural effusion or 


pneumothorax.


 


Impression:


 


Negative portable chest. 


 


Electronically signed by: Shree Almeida MD (8/28/2018 2:39 PM) 


[facility]














DICTATED and SIGNED BY:     SHREE ALMEIDA MD


DATE:     08/28/18 1436





Course & Med Decision Making


Course & Med Decision Making


Pertinent Labs and Imaging studies reviewed. (See chart for details)





This is a 22-year-old male patient presenting to the ED today with complaints 

of left-sided chest pain. Patient states he has history of heart attack last 

year. Patient's cardiac workup is negative. Heart score 1





Spoke with Sarahi VASQUEZ for cardiology we agreed patient will be admitted for 

observation.





15:58 Spoke with Dr. Babin who accepted patient for admission





Dragon Disclaimer


Dragon Disclaimer


This electronic medical record was generated, in whole or in part, using a 

voice recognition dictation system.





Departure


Departure


Impression:  


 Primary Impression:  


 Chest pain


Disposition:  09 ADMITTED AS INPATIENT


Condition:  STABLE


Referrals:  


AIDA STEWARD MD (PCP)





Problem Qualifiers








 Primary Impression:  


 Chest pain


 Chest pain type:  unspecified  Qualified Codes:  R07.9 - Chest pain, 

unspecified








LOIDA PATEL APRN Aug 28, 2018 14:15

## 2018-08-29 VITALS — SYSTOLIC BLOOD PRESSURE: 124 MMHG | DIASTOLIC BLOOD PRESSURE: 76 MMHG

## 2018-08-29 VITALS — SYSTOLIC BLOOD PRESSURE: 118 MMHG | DIASTOLIC BLOOD PRESSURE: 68 MMHG

## 2018-08-29 VITALS — SYSTOLIC BLOOD PRESSURE: 128 MMHG | DIASTOLIC BLOOD PRESSURE: 74 MMHG

## 2018-08-29 LAB
ANION GAP SERPL CALC-SCNC: 4 MMOL/L (ref 6–14)
BASOPHILS # BLD AUTO: 0 X10^3/UL (ref 0–0.2)
BASOPHILS NFR BLD: 1 % (ref 0–3)
BUN SERPL-MCNC: 13 MG/DL (ref 8–26)
CALCIUM SERPL-MCNC: 8.4 MG/DL (ref 8.5–10.1)
CHLORIDE SERPL-SCNC: 107 MMOL/L (ref 98–107)
CO2 SERPL-SCNC: 29 MMOL/L (ref 21–32)
CREAT SERPL-MCNC: 0.9 MG/DL (ref 0.7–1.3)
EOSINOPHIL NFR BLD: 0.2 X10^3/UL (ref 0–0.7)
EOSINOPHIL NFR BLD: 3 % (ref 0–3)
ERYTHROCYTE [DISTWIDTH] IN BLOOD BY AUTOMATED COUNT: 13.7 % (ref 11.5–14.5)
GFR SERPLBLD BASED ON 1.73 SQ M-ARVRAT: 104.6 ML/MIN
GLUCOSE SERPL-MCNC: 98 MG/DL (ref 70–99)
HCT VFR BLD CALC: 43.3 % (ref 39–53)
HGB BLD-MCNC: 14.7 G/DL (ref 13–17.5)
LYMPHOCYTES # BLD: 3.1 X10^3/UL (ref 1–4.8)
LYMPHOCYTES NFR BLD AUTO: 44 % (ref 24–48)
MCH RBC QN AUTO: 30 PG (ref 25–35)
MCHC RBC AUTO-ENTMCNC: 34 G/DL (ref 31–37)
MCV RBC AUTO: 87 FL (ref 79–100)
MONO #: 0.7 X10^3/UL (ref 0–1.1)
MONOCYTES NFR BLD: 10 % (ref 0–9)
NEUT #: 3 X10^3UL (ref 1.8–7.7)
NEUTROPHILS NFR BLD AUTO: 43 % (ref 31–73)
PLATELET # BLD AUTO: 247 X10^3/UL (ref 140–400)
POTASSIUM SERPL-SCNC: 3.8 MMOL/L (ref 3.5–5.1)
RBC # BLD AUTO: 4.99 X10^6/UL (ref 4.3–5.7)
SODIUM SERPL-SCNC: 140 MMOL/L (ref 136–145)
WBC # BLD AUTO: 7 X10^3/UL (ref 4–11)

## 2018-08-29 NOTE — PDOC3
Discharge Summary


Visit Information


Date of Admission:  Aug 28, 2018


Date of Discharge:  Aug 29, 2018


Admitting Diagnosis Comment:


1. Atypical CP: MSK, noncardiac.  Troponin nml and EKG SR without acute changes.


2. Hx of NSTEMI  r/t NICM, cocaine and vasopasm. 


3. NICM


4. Marijuana use





Recommendations


1. Continue with 81 mg ASA and plavix. 


2. Continue low dose norvasc. 


3. TTE for NICM f/u.


Final Diagnosis


Problems


Medical Problems:


(1) Chest pain


Status: Acute  











Brief Hospital Course


Allergies





 Allergies








Coded Allergies Type Severity Reaction Last Updated Verified


 


  No Known Drug Allergies    12/20/17 No








Vital Signs





Vital Signs








  Date Time  Temp Pulse Resp B/P (MAP) Pulse Ox O2 Delivery O2 Flow Rate FiO2


 


8/29/18 08:31  58  124/76    


 


8/29/18 08:00      Room Air  


 


8/29/18 07:00 97.9  18  97   





 97.9       








Lab Results





Laboratory Tests








Test


 8/28/18


14:50 8/28/18


17:40 8/28/18


20:48 8/29/18


03:30


 


White Blood Count


 9.4 x10^3/uL


(4.0-11.0) 


 


 7.0 x10^3/uL


(4.0-11.0)


 


Red Blood Count


 5.34 x10^6/uL


(4.30-5.70) 


 


 4.99 x10^6/uL


(4.30-5.70)


 


Hemoglobin


 15.4 g/dL


(13.0-17.5) 


 


 14.7 g/dL


(13.0-17.5)


 


Hematocrit


 45.6 %


(39.0-53.0) 


 


 43.3 %


(39.0-53.0)


 


Mean Corpuscular Volume 85 fL ()    87 fL () 


 


Mean Corpuscular Hemoglobin 29 pg (25-35)    30 pg (25-35) 


 


Mean Corpuscular Hemoglobin


Concent 34 g/dL


(31-37) 


 


 34 g/dL


(31-37)


 


Red Cell Distribution Width


 13.3 %


(11.5-14.5) 


 


 13.7 %


(11.5-14.5)


 


Platelet Count


 276 x10^3/uL


(140-400) 


 


 247 x10^3/uL


(140-400)


 


Neutrophils (%) (Auto) 71 % (31-73)    43 % (31-73) 


 


Lymphocytes (%) (Auto) 19 % (24-48)    44 % (24-48) 


 


Monocytes (%) (Auto) 9 % (0-9)    10 % (0-9) 


 


Eosinophils (%) (Auto) 1 % (0-3)    3 % (0-3) 


 


Basophils (%) (Auto) 0 % (0-3)    1 % (0-3) 


 


Neutrophils # (Auto)


 6.7 x10^3uL


(1.8-7.7) 


 


 3.0 x10^3uL


(1.8-7.7)


 


Lymphocytes # (Auto)


 1.8 x10^3/uL


(1.0-4.8) 


 


 3.1 x10^3/uL


(1.0-4.8)


 


Monocytes # (Auto)


 0.8 x10^3/uL


(0.0-1.1) 


 


 0.7 x10^3/uL


(0.0-1.1)


 


Eosinophils # (Auto)


 0.1 x10^3/uL


(0.0-0.7) 


 


 0.2 x10^3/uL


(0.0-0.7)


 


Basophils # (Auto)


 0.0 x10^3/uL


(0.0-0.2) 


 


 0.0 x10^3/uL


(0.0-0.2)


 


D-Dimer (Brigid)


 0.36 ug/mlFEU


(0.00-0.50) 


 


 





 


Urine Collection Type Unknown    


 


Urine Color Camille    


 


Urine Clarity Clear    


 


Urine pH 6.0    


 


Urine Specific Gravity >=1.030    


 


Urine Protein


 30 mg/dL


(NEG-TRACE) 


 


 





 


Urine Glucose (UA)


 Negative mg/dL


(NEG) 


 


 





 


Urine Ketones (Stick)


 Negative mg/dL


(NEG) 


 


 





 


Urine Blood Negative (NEG)    


 


Urine Nitrite Negative (NEG)    


 


Urine Bilirubin Small (NEG)    


 


Urine Urobilinogen Dipstick


 1.0 mg/dL (0.2


mg/dL) 


 


 





 


Urine Leukocyte Esterase Trace (NEG)    


 


Urine RBC Occ /HPF (0-2)    


 


Urine WBC


 5-10 /HPF


(0-4) 


 


 





 


Urine Squamous Epithelial


Cells Few /LPF 


 


 


 





 


Urine Bacteria


 Few /HPF


(0-FEW) 


 


 





 


Urine Hyaline Casts Few /HPF    


 


Urine Mucus Marked /LPF    


 


Sodium Level


 138 mmol/L


(136-145) 


 


 140 mmol/L


(136-145)


 


Potassium Level


 3.5 mmol/L


(3.5-5.1) 


 


 3.8 mmol/L


(3.5-5.1)


 


Chloride Level


 104 mmol/L


() 


 


 107 mmol/L


()


 


Carbon Dioxide Level


 27 mmol/L


(21-32) 


 


 29 mmol/L


(21-32)


 


Anion Gap 7 (6-14)    4 (6-14) 


 


Blood Urea Nitrogen


 12 mg/dL


(8-26) 


 


 13 mg/dL


(8-26)


 


Creatinine


 0.9 mg/dL


(0.7-1.3) 


 


 0.9 mg/dL


(0.7-1.3)


 


Estimated GFR


(Cockcroft-Gault) 104.6 


 


 


 104.6 





 


Glucose Level


 101 mg/dL


(70-99) 


 


 98 mg/dL


(70-99)


 


Calcium Level


 8.7 mg/dL


(8.5-10.1) 


 


 8.4 mg/dL


(8.5-10.1)


 


Magnesium Level


 1.9 mg/dL


(1.8-2.4) 


 


 





 


Creatine Kinase


 122 U/L


() 


 


 





 


Creatine Kinase MB (Mass)


 < 0.5 ng/mL


(0.0-3.6) 


 


 





 


Creatine Kinase MB Relative


Index  % (0-4) 


 


 


 





 


Troponin I Quantitative


 < 0.017 ng/mL


(0.000-0.055) < 0.017 ng/mL


(0.000-0.055) < 0.017 ng/mL


(0.000-0.055) 





 


NT-Pro-B-Type Natriuretic


Peptide 15 pg/mL


(0-124) 


 


 





 


Thyroid Stimulating Hormone


(TSH) 0.974 uIU/mL


(0.358-3.74) 


 


 





 


Urine Opiates Screen Neg (NEG)    


 


Urine Methadone Screen Neg (NEG)    


 


Urine Barbiturates Neg (NEG)    


 


Urine Phencyclidine Screen Neg (NEG)    


 


Urine


Amphetamine/Methamphetamine Neg (NEG) 


 


 


 





 


Urine Benzodiazepines Screen Neg (NEG)    


 


Urine Cocaine Screen Neg (NEG)    


 


Urine Cannabinoids Screen Pos (NEG)    


 


Urine Ethyl Alcohol Neg (NEG)    








Laboratory Tests








Test


 8/28/18


14:50 8/28/18


17:40 8/28/18


20:48 8/29/18


03:30


 


White Blood Count


 9.4 x10^3/uL


(4.0-11.0) 


 


 7.0 x10^3/uL


(4.0-11.0)


 


Red Blood Count


 5.34 x10^6/uL


(4.30-5.70) 


 


 4.99 x10^6/uL


(4.30-5.70)


 


Hemoglobin


 15.4 g/dL


(13.0-17.5) 


 


 14.7 g/dL


(13.0-17.5)


 


Hematocrit


 45.6 %


(39.0-53.0) 


 


 43.3 %


(39.0-53.0)


 


Mean Corpuscular Volume 85 fL ()    87 fL () 


 


Mean Corpuscular Hemoglobin 29 pg (25-35)    30 pg (25-35) 


 


Mean Corpuscular Hemoglobin


Concent 34 g/dL


(31-37) 


 


 34 g/dL


(31-37)


 


Red Cell Distribution Width


 13.3 %


(11.5-14.5) 


 


 13.7 %


(11.5-14.5)


 


Platelet Count


 276 x10^3/uL


(140-400) 


 


 247 x10^3/uL


(140-400)


 


Neutrophils (%) (Auto) 71 % (31-73)    43 % (31-73) 


 


Lymphocytes (%) (Auto) 19 % (24-48)    44 % (24-48) 


 


Monocytes (%) (Auto) 9 % (0-9)    10 % (0-9) 


 


Eosinophils (%) (Auto) 1 % (0-3)    3 % (0-3) 


 


Basophils (%) (Auto) 0 % (0-3)    1 % (0-3) 


 


Neutrophils # (Auto)


 6.7 x10^3uL


(1.8-7.7) 


 


 3.0 x10^3uL


(1.8-7.7)


 


Lymphocytes # (Auto)


 1.8 x10^3/uL


(1.0-4.8) 


 


 3.1 x10^3/uL


(1.0-4.8)


 


Monocytes # (Auto)


 0.8 x10^3/uL


(0.0-1.1) 


 


 0.7 x10^3/uL


(0.0-1.1)


 


Eosinophils # (Auto)


 0.1 x10^3/uL


(0.0-0.7) 


 


 0.2 x10^3/uL


(0.0-0.7)


 


Basophils # (Auto)


 0.0 x10^3/uL


(0.0-0.2) 


 


 0.0 x10^3/uL


(0.0-0.2)


 


D-Dimer (Brigid)


 0.36 ug/mlFEU


(0.00-0.50) 


 


 





 


Urine Collection Type Unknown    


 


Urine Color Camille    


 


Urine Clarity Clear    


 


Urine pH 6.0    


 


Urine Specific Gravity >=1.030    


 


Urine Protein


 30 mg/dL


(NEG-TRACE) 


 


 





 


Urine Glucose (UA)


 Negative mg/dL


(NEG) 


 


 





 


Urine Ketones (Stick)


 Negative mg/dL


(NEG) 


 


 





 


Urine Blood Negative (NEG)    


 


Urine Nitrite Negative (NEG)    


 


Urine Bilirubin Small (NEG)    


 


Urine Urobilinogen Dipstick


 1.0 mg/dL (0.2


mg/dL) 


 


 





 


Urine Leukocyte Esterase Trace (NEG)    


 


Urine RBC Occ /HPF (0-2)    


 


Urine WBC


 5-10 /HPF


(0-4) 


 


 





 


Urine Squamous Epithelial


Cells Few /LPF 


 


 


 





 


Urine Bacteria


 Few /HPF


(0-FEW) 


 


 





 


Urine Hyaline Casts Few /HPF    


 


Urine Mucus Marked /LPF    


 


Sodium Level


 138 mmol/L


(136-145) 


 


 140 mmol/L


(136-145)


 


Potassium Level


 3.5 mmol/L


(3.5-5.1) 


 


 3.8 mmol/L


(3.5-5.1)


 


Chloride Level


 104 mmol/L


() 


 


 107 mmol/L


()


 


Carbon Dioxide Level


 27 mmol/L


(21-32) 


 


 29 mmol/L


(21-32)


 


Anion Gap 7 (6-14)    4 (6-14) 


 


Blood Urea Nitrogen


 12 mg/dL


(8-26) 


 


 13 mg/dL


(8-26)


 


Creatinine


 0.9 mg/dL


(0.7-1.3) 


 


 0.9 mg/dL


(0.7-1.3)


 


Estimated GFR


(Cockcroft-Gault) 104.6 


 


 


 104.6 





 


Glucose Level


 101 mg/dL


(70-99) 


 


 98 mg/dL


(70-99)


 


Calcium Level


 8.7 mg/dL


(8.5-10.1) 


 


 8.4 mg/dL


(8.5-10.1)


 


Magnesium Level


 1.9 mg/dL


(1.8-2.4) 


 


 





 


Creatine Kinase


 122 U/L


() 


 


 





 


Creatine Kinase MB (Mass)


 < 0.5 ng/mL


(0.0-3.6) 


 


 





 


Creatine Kinase MB Relative


Index  % (0-4) 


 


 


 





 


Troponin I Quantitative


 < 0.017 ng/mL


(0.000-0.055) < 0.017 ng/mL


(0.000-0.055) < 0.017 ng/mL


(0.000-0.055) 





 


NT-Pro-B-Type Natriuretic


Peptide 15 pg/mL


(0-124) 


 


 





 


Thyroid Stimulating Hormone


(TSH) 0.974 uIU/mL


(0.358-3.74) 


 


 





 


Urine Opiates Screen Neg (NEG)    


 


Urine Methadone Screen Neg (NEG)    


 


Urine Barbiturates Neg (NEG)    


 


Urine Phencyclidine Screen Neg (NEG)    


 


Urine


Amphetamine/Methamphetamine Neg (NEG) 


 


 


 





 


Urine Benzodiazepines Screen Neg (NEG)    


 


Urine Cocaine Screen Neg (NEG)    


 


Urine Cannabinoids Screen Pos (NEG)    


 


Urine Ethyl Alcohol Neg (NEG)    








Brief Hospital Course


Mr. Mcdowell  is a 22 old  male admitted for chest pain which is 

noncardiac mostly MSKskeletal. Troponins are normal with no acute changes in 

the EKG. He does have history of N STEMI with nonischemic cardiomyopathy, 

cocaine and vasospasm. Seen by cardiology, no change in meds, okay for home if 

echocardiogram today is normal, he still has medications at home, no scripts 

needed for me. Patient seen and examined, discharge time less than 30 minutes


Consults performed cardiology


Procedures performed echocardiogram





Discharge Information


Condition at Discharge:  Improved, Stable


Disposition/Orders:  D/C to Home


Scheduled


Acetaminophen (Tylenol) 325 Mg Tablet, 1-2 TAB PO QID, #60 Ref 2 (Reported)


   Entered as Reported by: RONIT LIRIANO on 12/21/17 1652


   Last Taken: Unknown Dose on 8/27/18      Last Action: Continued on 8/28/18 1939 by ULI LYNCH MD


Amlodipine Besylate (Amlodipine Besylate) 2.5 Mg Tablet, 2.5 MG PO DAILY, (

Reported)


   Entered as Reported by: RONIT LIRIANO on 12/21/17 1652


   Last Taken: Unknown Dose on 8/27/18      Last Action: Converted on 8/28/18 1939 by ULI LYNCH MD


Aspirin (Aspirin) 325 Mg Tablet, 1 TAB PO DAILY, #30 Ref 5 (Reported)


   Entered as Reported by: RONIT LIRIANO on 12/21/17 1652


   Last Taken: Unknown Dose on 8/27/18      Last Action: Continued on 8/28/18 1939 by ULI LYNCH MD


Clopidogrel Bisulfate (Clopidogrel) 75 Mg Tablet, 75 MG PO DAILY for 60 Days, #

60


   Prescribed by: CHRISTIANO CHANEY on 8/29/18 0926





Scheduled PRN


Nitroglycerin (NITROGLYCERIN SubLingual) 0.4 Mg Tab.subl, 0.4 MG SL PRN Q5MIN 

PRN for CHEST PAIN, (Reported)


   Entered as Reported by: RONIT LIRIANO on 12/21/17 1652


   Last Taken: Unknown Dose on 8/28/18      Last Action: Continued on 8/28/18 1939 by MD SHIV CRAIG CHERRIE Y MD Aug 29, 2018 10:44

## 2018-08-29 NOTE — CARD
MR#: [id number]

Account#: [id number]

Accession#: [id number]

Date of Study: 08/29/2018

Ordering Physician: JOVAN THORPE, 

Referring Physician: ULI LYNCH, 

Tech: JN Fleming





--------------- APPROVED REPORT --------------





EXAM: Two-dimensional and M-mode echocardiogram with Doppler and color Doppler.



Other Information 

Quality : AverageHR: 56bpm



INDICATION

NICM



2D DIMENSIONS 

RVDd3.5 (2.9-3.5cm)Left Atrium(2D)3.5 (1.6-4.0cm)

IVSd0.9 (0.7-1.1cm)Aortic Root(2D)2.9 (2.0-3.7cm)

LVDd5.4 (3.9-5.9cm)LVOT Diameter2.2 (1.8-2.4cm)

PWd0.8 (0.7-1.1cm)LVDs3.6 (2.5-4.0cm)

FS (%) 34.4 %SV90.2 ml

LVEF(%)62.9 (>50%)



Aortic Valve

AoV Peak Holger.132.7cm/sAoV VTI28.6cm

AO Peak GR.7.0mmHgLVOT Peak Holger.78.7cm/s

LVOT  VTI 17.89cmAO Mean GR.4mmHg

RONNY (VMAX)1.30iu2VBA   (VTI)2.43cm2



Mitral Valve

MV E Gmacjhsv32.2cm/sMV DECEL [id number]

MV A Tssjjqfz78.5cm/sMV SPL21gs

E/A  Ratio2.8MVA (PHT)4.95cm2



TDI

E/Lateral E'3.8E/Medial E'5.5



Pulmonary Valve

PV Peak Zuohmelu147.2cm/sPV Peak Grad.5mmHg



Tricuspid Valve

TR P. Nrmhugsi727jj/sTR Peak Gr.21mmHg



Pulmonary Vein

S1 Mtjrlpct14.8cm/sD2 Roygvuzc51.9cm/s



 LEFT VENTRICLE 

The left ventricle is normal size. There is normal left ventricular wall thickness. The left ventricu
lar systolic function is normal. The ejection fraction is estimated at 55-60%. There is normal LV seg
mental wall motion. The left ventricular diastolic function and filling is normal for age.



 RIGHT VENTRICLE 

The right ventricle is normal size. The right ventricular systolic function is normal.



 ATRIA 

The left atrium size is normal. The right atrium size is normal. The interatrial septum is intact wit
h no evidence for an atrial septal defect or patent foramen ovale as noted on 2-D or Doppler imaging.




 AORTIC VALVE 

The aortic valve is normal in structure and function. Doppler and Color Flow revealed no significant 
aortic regurgitation. There is no significant aortic valvular stenosis. There is no aortic valvular v
egetation.



 MITRAL VALVE 

The mitral valve is normal in structure and function. There is no evidence of mitral valve prolapse. 
There is no mitral valve stenosis. Doppler and Color Flow revealed no mitral valve regurgitation note
d.



 TRICUSPID VALVE 

The tricuspid valve is normal in structure. Doppler and Color Flow revealed trace tricuspid regurgita
tion. There is no tricuspid valve prolapse or vegetation. There is no tricuspid valve stenosis.



 PULMONIC VALVE 

The pulmonic valve is not well visualized. Doppler and Color Flow revealed no pulmonic valvular regur
gitation. There is no pulmonic valvular stenosis.



 GREAT VESSELS 

The aortic root is normal in size. The IVC is dilated and collapses <50% with inspiration. Patient sa
ys they are athletic.



 PERICARDIAL EFFUSION 

There is no pleural effusion. There is no evidence of significant pericardial effusion.



Critical Notification

Critical Value: No



<Conclusion>

The left ventricular systolic function is normal.

The ejection fraction is estimated at 55-60%.

There is normal LV segmental wall motion.

Trace tricuspid regurgitation.

There is no evidence of significant pericardial effusion.



Signed by : Eric White, 

Electronically Approved : 08/29/2018 10:16:38